# Patient Record
Sex: FEMALE | Race: WHITE
[De-identification: names, ages, dates, MRNs, and addresses within clinical notes are randomized per-mention and may not be internally consistent; named-entity substitution may affect disease eponyms.]

---

## 2020-06-26 ENCOUNTER — HOSPITAL ENCOUNTER (EMERGENCY)
Dept: HOSPITAL 97 - ER | Age: 55
Discharge: HOME | End: 2020-06-26
Payer: SELF-PAY

## 2020-06-26 VITALS — DIASTOLIC BLOOD PRESSURE: 70 MMHG | OXYGEN SATURATION: 99 % | SYSTOLIC BLOOD PRESSURE: 124 MMHG | TEMPERATURE: 97.4 F
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
## 2020-06-26 DIAGNOSIS — G89.11: ICD-10-CM
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
## 2020-06-26 DIAGNOSIS — M54.5: Primary | ICD-10-CM

## 2020-06-26 DIAGNOSIS — Z88.2: ICD-10-CM

## 2020-06-26 PROCEDURE — 72131 CT LUMBAR SPINE W/O DYE: CPT

## 2020-06-26 PROCEDURE — 99283 EMERGENCY DEPT VISIT LOW MDM: CPT

## 2020-06-26 PROCEDURE — 71046 X-RAY EXAM CHEST 2 VIEWS: CPT
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
## 2020-06-26 NOTE — RAD REPORT
EXAM DESCRIPTION:  Ladonna Hernandez (2 Views)6/26/2020 8:40 pm

 

CLINICAL HISTORY:  Chest pain

 

COMPARISON:  None

 

FINDINGS:   The lungs appear clear of acute infiltrate. The heart is normal size

 

IMPRESSION:   No acute abnormalities displayed
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
## 2020-06-26 NOTE — XMS REPORT
Continuity of Care Document

                            Created on:2020



Patient:ISAAK MEDEIROS

Sex:Female

:1965

External Reference #:[id number]





Demographics







                          Address                   [address] 



                                                    [place] [address]

 

                          Home Phone                [phone]

 

                          Work Phone                [phone]

 

                          Email Address             N

 

                          Preferred Language        English

 

                          Marital Status            Unknown

 

                          Scientologist Affiliation     Unknown

 

                          Race                      Unknown

 

                          Additional Race(s)        Unavailable

 

                          Ethnic Group              Unknown









Author







                          Organization              [facility]

t

 

                          Address                   [address]



                                                    [place] [address]

 

                          Phone                     [phone]









Support







                Name            Relationship    Address         Phone

 

                WALDEMAR        Unavailable     [id number]     [phone]



                                                [place] [address] 

 

                SAVANT          Unavailable     [id number]     [phone]



                                                [place] [address] 









Care Team Providers







                    Name                Role                Phone

 

                    Unavailable         Unavailable         Unavailable









Payers







           Payer Name Policy Type Policy Number Effective Date Expiration Date S

ource







Problems

This patient has no known problems.



Allergies, Adverse Reactions, Alerts







       Allergy Allergy Status Severity Reaction(s) Onset  Inactive Treating Comm

ents 

Source



       Name   Type                        Date   Date   Clinician        

 

       No Known DA     Active U                                   [facility]



       Drug                               3-14                        [place]



       Allergie                             00:00:                      d



       s                                  00                          [facility]







Medications

This patient has no known medications.



Procedures

This patient has no known procedures.



Results







           Test Description Test Time  Test Comments Results    Result     [facility]

e



                                                       Comments   

 

           [facility] BREAST 2020            -----------------------           

 



           BIOPSY     16:04:00              -----------------------            



                                            -----------------------            



                                            -----------------------            



                                            RUN DATE: 20            



                                                         [facility]            



                                                              PAGE            



                                            1   RUN TIME: 1604            



                                                                  



                                            Specimen Inquiry            



                                                        RUN USER:            



                                            INTERFACE             



                                                                  



                                                                  



                                            -----------------------            



                                            -----------------------            



                                            -----------------------            



                                            -----------------------            



                                            PATIENT:              



                                            ISAAK MEDEIROS            



                                                  ACCT #:            



                                            [id number] LOC:            



                                            ALYSSA DOWLING #:            



                                            [id number]            



                                                                  



                                             AGE/SX: 54/F            



                                            ROOM:            RE/20/20REG DR:            



                                            Sue Jasso MD            



                                                  :    63            



                                               BED:               



                                            DIS:                  



                                                                  



                                                STATUS: REG REF            



                                             TLOC:                



                                            -----------------------            



                                            -----------------------            



                                            -----------------------            



                                            -----------------------            



                                            SPEC #: [id number]:[id number]            



                                               RECD:             



                                               STATUS:  TREVOR            



                                               REQ #: [id number]            



                                                                  



                                            MARIN:             



                                            SUBM DR: Sue Jasso MD                    



                                            ENTERED:              



                                               SP TYPE: [facility]            



                                               OTHR DR: No Primary            



                                            or Family             



                                            PhysicianORDERED:            



                                            [facility] BILLING            



                                                                  



                                                                  



                                                       COPIES TO:            



                                            No Primary or Family            



                                            Physician    Sue Jasso MD   [address]            



                                            [address]   [place] [address]   [phone]            



                                            HISTOLOGY:   TISSUE            



                                                 ID    BLK   PCS            



                                            MICHAEL LEV PROCEDURE            



                                            DISPOSITION            



                                            _______________ ____            



                                            ______ ___ ___ ___            



                                            _______________            



                                            ___________   BREAST,            



                                            NOS     A             



                                                  2               



                                             PROCEDURES: [facility]            



                                            BILLING ()            



                                            TISSUES:           A.            



                                            BREAST, NOS - RIGHT            



                                            BREAST BIOPSY @ 7            



                                            O'CLOCK      [facility]            



                                            PATHOLOGY REPORT            



                                            Result ID: [id number]            



                                                MRN: [id number]            



                                            Clinical History            



                                            Right breast mass            



                                             Diagnosis   Breast,            



                                            right, 7 o'clock, 7 cm            



                                            from nipple,            



                                            ultrasound-guided            



                                            needle core   biopsy:            



                                            USUAL DUCTAL            



                                            HYPERPLASIA, MILD (see            



                                            comment)   NEGATIVE FOR            



                                            ATYPICAL HYPERPLASIA OR            



                                            MALIGNANCY            



                                            Comments   The            



                                            histologic findings do            



                                            not explain a mass            



                                            lesion.  Recommend            



                                            additional   studies as            



                                            clinically indicated.            



                                                Gross Description            



                                            "Right breast biopsy @            



                                            7 o'clock, 7 cm from            



                                            nipple"   Received in            



                                            formalin are   four tan            



                                            tissue fragments, 0.4 -            



                                            1.6 cm, all as A.            



                                              Fixation time:  The            



                                            specimen was obtained            



                                            on 2020            



                                            and placed in            



                                            formalin at 0835 CST,            



                                            processor started on            



                                            2020 at            



                                            0300 CST,             



                                                             **            



                                            CONTINUED ON NEXT PAGE            



                                            **                    



                                            -----------------------            



                                            -----------------------            



                                            -----------------------            



                                            -----------------------            



                                            RUN DATE: 20            



                                                         [facility]            



                                                              PAGE            



                                            2   RUN TIME: 1604            



                                                                  



                                            Specimen Inquiry            



                                                        RUN USER:            



                                            INTERFACE             



                                                                  



                                                                  



                                            -----------------------            



                                            -----------------------            



                                            -----------------------            



                                            -----------------------            



                                            SPEC #: [facility]:[id number]            



                                              PATIENT:            



                                            ISAAK MEDEIROS            



                                                   #[id number]            



                                            (Continued)------------            



                                            -----------------------            



                                            -----------------------            



                                            -----------------------            



                                            -----------      [facility]            



                                            PATHOLOGY REPORT            



                                            (Continued)    fixation            



                                            time 18 hours 25            



                                            minutes.              



                                            Microscopic Findings            



                                            "Right breast biopsy @            



                                            7 o'clock, 7 cm from            



                                            nipple"  Breast            



                                            sections   demonstrate            



                                            breast tissue with            



                                            usual ductal            



                                            hyperplasia. No            



                                            atypical ductal            



                                            hyperplasia or invasion            



                                            is present on the            



                                            sections examined.            



                                             Intradepartmental            



                                            Consultation:            



                                            Didi Angeles M.D.            



                                               ELECTRONIC SIGNATURE            



                                              Jojo Evans M.D.            



                                                                  



                                            [phone]            



                                            -----------------------            



                                            -----------------------            



                                            -----------------------            



                                            ------------ Signed            



                                            SIGNATURE ON [place] JONNATHAN            



                                            20 1604            



                                            -----------------------            



                                            -----------------------            



                                            -----------------------            



                                            -----------------------            



                                                                  



                                                                  



                                                              **            



                                            END OF REPORT **